# Patient Record
Sex: MALE | Race: BLACK OR AFRICAN AMERICAN | Employment: OTHER | URBAN - METROPOLITAN AREA
[De-identification: names, ages, dates, MRNs, and addresses within clinical notes are randomized per-mention and may not be internally consistent; named-entity substitution may affect disease eponyms.]

---

## 2020-03-02 ENCOUNTER — APPOINTMENT (OUTPATIENT)
Dept: CT IMAGING | Facility: CLINIC | Age: 40
End: 2020-03-02
Attending: EMERGENCY MEDICINE
Payer: MEDICARE

## 2020-03-02 ENCOUNTER — HOSPITAL ENCOUNTER (EMERGENCY)
Facility: CLINIC | Age: 40
Discharge: HOME | End: 2020-03-02
Attending: EMERGENCY MEDICINE | Admitting: EMERGENCY MEDICINE
Payer: MEDICARE

## 2020-03-02 VITALS
HEART RATE: 67 BPM | DIASTOLIC BLOOD PRESSURE: 81 MMHG | RESPIRATION RATE: 16 BRPM | SYSTOLIC BLOOD PRESSURE: 123 MMHG | OXYGEN SATURATION: 99 % | TEMPERATURE: 96.6 F

## 2020-03-02 DIAGNOSIS — R19.7 DIARRHEA, UNSPECIFIED TYPE: ICD-10-CM

## 2020-03-02 LAB
ANION GAP SERPL CALCULATED.3IONS-SCNC: 1 MMOL/L (ref 3–14)
BASOPHILS # BLD AUTO: 0.1 10E9/L (ref 0–0.2)
BASOPHILS NFR BLD AUTO: 0.9 %
BUN SERPL-MCNC: 13 MG/DL (ref 7–30)
CALCIUM SERPL-MCNC: 8.5 MG/DL (ref 8.5–10.1)
CHLORIDE SERPL-SCNC: 110 MMOL/L (ref 94–109)
CO2 SERPL-SCNC: 31 MMOL/L (ref 20–32)
CREAT SERPL-MCNC: 0.86 MG/DL (ref 0.66–1.25)
DIFFERENTIAL METHOD BLD: ABNORMAL
EOSINOPHIL # BLD AUTO: 1.3 10E9/L (ref 0–0.7)
EOSINOPHIL NFR BLD AUTO: 18.2 %
ERYTHROCYTE [DISTWIDTH] IN BLOOD BY AUTOMATED COUNT: 13.1 % (ref 10–15)
GFR SERPL CREATININE-BSD FRML MDRD: >90 ML/MIN/{1.73_M2}
GLUCOSE SERPL-MCNC: 101 MG/DL (ref 70–99)
HCT VFR BLD AUTO: 41.7 % (ref 40–53)
HGB BLD-MCNC: 13.4 G/DL (ref 13.3–17.7)
IMM GRANULOCYTES # BLD: 0 10E9/L (ref 0–0.4)
IMM GRANULOCYTES NFR BLD: 0.1 %
LYMPHOCYTES # BLD AUTO: 1.9 10E9/L (ref 0.8–5.3)
LYMPHOCYTES NFR BLD AUTO: 26.8 %
MCH RBC QN AUTO: 30.1 PG (ref 26.5–33)
MCHC RBC AUTO-ENTMCNC: 32.1 G/DL (ref 31.5–36.5)
MCV RBC AUTO: 94 FL (ref 78–100)
MONOCYTES # BLD AUTO: 0.6 10E9/L (ref 0–1.3)
MONOCYTES NFR BLD AUTO: 8.7 %
NEUTROPHILS # BLD AUTO: 3.2 10E9/L (ref 1.6–8.3)
NEUTROPHILS NFR BLD AUTO: 45.3 %
NRBC # BLD AUTO: 0 10*3/UL
NRBC BLD AUTO-RTO: 0 /100
PLATELET # BLD AUTO: 279 10E9/L (ref 150–450)
POTASSIUM SERPL-SCNC: 3.8 MMOL/L (ref 3.4–5.3)
RBC # BLD AUTO: 4.45 10E12/L (ref 4.4–5.9)
SODIUM SERPL-SCNC: 142 MMOL/L (ref 133–144)
WBC # BLD AUTO: 7.1 10E9/L (ref 4–11)

## 2020-03-02 PROCEDURE — 25800030 ZZH RX IP 258 OP 636: Performed by: EMERGENCY MEDICINE

## 2020-03-02 PROCEDURE — 96360 HYDRATION IV INFUSION INIT: CPT

## 2020-03-02 PROCEDURE — 85025 COMPLETE CBC W/AUTO DIFF WBC: CPT | Performed by: EMERGENCY MEDICINE

## 2020-03-02 PROCEDURE — 96361 HYDRATE IV INFUSION ADD-ON: CPT

## 2020-03-02 PROCEDURE — 80048 BASIC METABOLIC PNL TOTAL CA: CPT | Performed by: EMERGENCY MEDICINE

## 2020-03-02 PROCEDURE — 25000128 H RX IP 250 OP 636: Performed by: EMERGENCY MEDICINE

## 2020-03-02 PROCEDURE — 74177 CT ABD & PELVIS W/CONTRAST: CPT

## 2020-03-02 PROCEDURE — 99285 EMERGENCY DEPT VISIT HI MDM: CPT | Mod: 25

## 2020-03-02 RX ORDER — IOPAMIDOL 755 MG/ML
63 INJECTION, SOLUTION INTRAVASCULAR ONCE
Status: COMPLETED | OUTPATIENT
Start: 2020-03-02 | End: 2020-03-02

## 2020-03-02 RX ORDER — SODIUM CHLORIDE 9 MG/ML
1000 INJECTION, SOLUTION INTRAVENOUS CONTINUOUS
Status: DISCONTINUED | OUTPATIENT
Start: 2020-03-02 | End: 2020-03-02 | Stop reason: HOSPADM

## 2020-03-02 RX ADMIN — SODIUM CHLORIDE 1000 ML: 9 INJECTION, SOLUTION INTRAVENOUS at 11:42

## 2020-03-02 RX ADMIN — IOPAMIDOL 63 ML: 755 INJECTION, SOLUTION INTRAVENOUS at 11:52

## 2020-03-02 NOTE — ED PROVIDER NOTES
History     Chief Complaint:  Diarrhea     HPI   Ashleigh Ma is a 39 year old male who presents to the emergency department for evaluation of diarrhea. The patient reports he has experienced around 3 weeks of diarrhea, 3-4 episodes per day, after eating a [employer] sandwich. The patient's wife states he is also having diarrheic episodes in his sleep. The patient denies any blood in his stool, abdominal pain, chest pain, shortness of breath, fever, or chills, as well as any decreased appetite. He indicates he has not had any recent travel or antibiotic use.    Allergies:  NKDA     Medications:    The patient is currently on no regular medications.    Past Medical History:    Cardiospasm and achalasia     Past Surgical History:    Esophageal sphincter surgery     Family History:    HLD  HTN  Obesity    Social History:  Presents with wife.  Never smoker.   Marital Status:   [2]    Review of Systems   All other systems reviewed and are negative.      Physical Exam     Patient Vitals for the past 24 hrs:   BP Temp Temp src Pulse Resp SpO2   03/02/20 1300 117/75 -- -- 56 -- --   03/02/20 1245 118/77 -- -- 52 -- --   03/02/20 1230 117/71 -- -- 53 -- --   03/02/20 1215 114/79 -- -- 60 -- --   03/02/20 0906 124/83 96.6  F (35.9  C) Temporal 73 16 99 %     Physical Exam  General: Resting on the bed.  Head: No obvious trauma to head.  Ears, Nose, Throat:  External ears normal.  Nose normal.    Eyes:  Conjunctivae clear.  Pupils are equal, round, and reactive.   Neck: Normal range of motion.  Neck supple.   CV: Regular rate and rhythm.  No murmurs.      Respiratory: Effort normal and breath sounds normal.  No wheezing or crackles.   Gastrointestinal: Soft.  No distension. There is mild LLQ tenderness.  There is no rigidity, no rebound and no guarding.   Neuro: Alert. Moving all extremities appropriately.  Normal speech.    Skin: Skin is warm and dry.  No rash noted.     Emergency Department Course      Imaging:  Radiology findings were communicated with the patient who voiced understanding of the findings.    CT Abdomen/Pelvis with contrast:  1. No acute pathology in the abdomen or pelvis.  2. Significant distal esophageal dilatation containing frothy debris.  As per radiology.    Laboratory:  Laboratory findings were communicated with the patient who voiced understanding of the findings.    CBC: WBC: 7.1, HGB: 13.4, PLT: 279  BMP: Glucose 101 (H), Chloride 110 (H), Anion Gap 1 (L), o/w WNL (Creatinine: 0.86)    Ova and parasite exam routine pending.  Clostridium difficile toxin B PCR pending.  Enteric Bacteria and virus panel by [facility] stool pending.    Interventions:  1142 NS 1L IV Bolus    Emergency Department Course:  Past medical records, nursing notes, and vitals reviewed.    1059 I performed an exam of the patient as documented above.     IV was inserted and blood was drawn for laboratory testing, results above.    Stool sample was obtained and sent for laboratory analysis, findings above.    The patient was sent for a CT Abdomen/Pelvis while in the emergency department, results above.     1318 I rechecked the patient and discussed the results of his workup thus far.     Findings and plan explained to the Patient. Patient discharged home with instructions regarding supportive care, medications, and reasons to return. The importance of close follow-up was reviewed.     I personally reviewed the laboratory results with the Patient and answered all related questions prior to discharge.     Impression & Plan     Medical Decision Making:  Ashleigh Ma is a 39 year old male presents with diarrhea.  Vital signs reassuring.  Broad differential was pursued including but not limited to colitis, diverticulitis, infectious diarrhea, parasite, C. difficile, dehydration, electrolyte, metabolic, renal dysfunction, etc.  CBC shows no leukocytosis or anemia.  BMP shows no acute electrolyte, metabolic, renal  dysfunction.  Unable to obtain stool studies as patient is unable to give a stool sample in the ER.  Outpatient ova and parasite, C. difficile and enteric bacteria and viral panel were ordered.  Patient encouraged to bring these to clinic for follow-up.  Patient does have evidence of eosinophils on CBC thus O&P was ordered as well.  CT shows no evidence acute diverticulitis, obstruction, colitis, perforation, etc.  Does show evidence of esophageal dilatation.  In reviewing chart patient does have a history of achalasia and has had prior dilatations.  I encouraged that he follow-up with his primary doctor and surgeon regarding this.  Patient is otherwise well-appearing nontoxic.  No evidence of sepsis or severe dehydration.  No bloody stools, no high fevers, no indication for empiric antibiotics.  No recent trips or travels.  No other high risk features.  Patient unable to provide stool sample in the ER therefore encouraged for gastroenterology outpatient follow-up and primary care follow-up as indicated.  This was advised the next 2 to 3 days.  Strict return precautions were discussed.  Patient was discharged home.    Diagnosis:    ICD-10-CM   1. Diarrhea, unspecified type R19.7     Disposition:  Discharged to home.     Scribe Disclosure:  Alexandre SAMS, am serving as a scribe at 10:55 AM on 3/2/2020 to document services personally performed by Dania Frausto MD based on my observations and the provider's statements to me.     [facility] EMERGENCY DEPARTMENT     Dania Frausto MD  03/02/20 2069

## 2020-03-02 NOTE — ED AVS SNAPSHOT
[facility] Emergency Department  [address]  [facility] [address]  Phone:  [phone]  Fax:  [phone]                                    Ashleigh Ma   MRN: [id number]    Department:  [facility] Emergency Department   Date of Visit:  3/2/2020           After Visit Summary Signature Page    I have received my discharge instructions, and my questions have been answered. I have discussed any challenges I see with this plan with the nurse or doctor.    ..........................................................................................................................................  Patient/Patient Representative Signature      ..........................................................................................................................................  Patient Representative Print Name and Relationship to Patient    ..................................................               ................................................  Date                                   Time    ..........................................................................................................................................  Reviewed by Signature/Title    ...................................................              ..............................................  Date                                               Time          22EPIC Rev 08/18

## 2020-03-02 NOTE — DISCHARGE INSTRUCTIONS
Please collect stool sample and bring to your clinic and or lab to run.  They will call you if the result returns positive.  Regardless you should follow-up with your primary care doctor and gastroenterology to further investigate your ongoing diarrhea.  Follow-up with your surgeon regarding the esophagus as you may need a repeat endoscopy for this.  I would defer to your surgeon to decide if you need further dilatation.  Return to the ER if having fevers, bloody stools, dehydration, severe abdominal pain or other acute concerns.      Discharge Instructions  Adult Diarrhea    You have been seen today for diarrhea (loose stools). This is usually caused by a virus, but some bacteria, parasites, medicines, or other medical conditions can cause similar symptoms. At this time your provider does not find that your diarrhea is a sign of anything dangerous or life-threatening. However, sometimes the signs of serious illness do not show up right away. If you have new or worse symptoms, you may need to be seen again in the Emergency Department or by your primary provider.     Generally, every Emergency Department visit should have a follow-up clinic visit with either a primary or a specialty clinic/provider. Please follow-up as instructed by your emergency provider today.    Return to the Emergency Department if:  You feel you are getting dehydrated, such as being very thirsty, not urinating (peeing) like usual, or feeling faint or lightheaded.   You develop a new fever.  You have abdominal (belly) pain that seems worse than cramps, is in one spot, or is getting worse over time.   You have blood in your stool or your stool becomes black.  (Remember that if you take Pepto-Bismol , this will turn your stool black).   You feel very weak.    What can I do to help myself?  The most important thing to do is to drink clear liquids.   It is best to have only small, frequent sips of liquids. Drinking too much at once may cause more  "diarrhea. You should also replace minerals, sodium and potassium lost with diarrhea. Pedialyte  and sports drinks can help you replace these minerals. You can also drink clear liquids such as water, weak tea, apple juice, and 7-Up . Avoid acidic liquids (orange juice), caffeine (coffee) or alcohol. Milk products will make the diarrhea worse.  Eat bland (plain) foods. Soda crackers, toast, plain noodles, gelatin, applesauce and bananas are good first choices. Avoid foods that have acid, are spicy, fatty or fibrous (such as meats, coarse grains, vegetables). You may start eating these foods again in about 3 days when you are better.   Sometimes treatment includes prescription medicine to prevent diarrhea. If your provider prescribes these for you, take them as directed.   Nonprescription medicine is available for the treatment of diarrhea and can be very effective. If you use it, make sure you use the dose recommended on the package. Check with your healthcare provider before you use any medicine for diarrhea.   Do not take ibuprofen, or other nonsteroidal anti-inflammatory medicines, without checking with your healthcare provider.   Probiotics: If you have been given an antibiotic, you may want to also take a probiotic pill or eat yogurt with live cultures. Probiotics have \"good bacteria\" to help your intestines stay healthy. Studies have shown that probiotics help prevent diarrhea and other intestine problems (including C. diff infection) when you take antibiotics. You can buy these without a prescription in the pharmacy section of the store.   If you were given a prescription for medicine here today, be sure to read all of the information (including the package insert) that comes with your prescription.  This will include important information about the medicine, its side effects, and any warnings that you need to know about.  The pharmacist who fills the prescription can provide more information and answer " questions you may have about the medicine.  If you have questions or concerns that the pharmacist cannot address, please call or return to the Emergency Department.  Remember that you can always come back to the Emergency Department if you are not able to see your regular provider in the amount of time listed above, if you get any new symptoms, or if there is anything that worries you.

## 2020-09-29 ENCOUNTER — HOSPITAL ENCOUNTER (EMERGENCY)
Facility: CLINIC | Age: 40
Discharge: HOME | End: 2020-09-29
Attending: EMERGENCY MEDICINE | Admitting: EMERGENCY MEDICINE
Payer: MEDICARE

## 2020-09-29 VITALS
TEMPERATURE: 98.1 F | OXYGEN SATURATION: 99 % | HEART RATE: 87 BPM | DIASTOLIC BLOOD PRESSURE: 78 MMHG | RESPIRATION RATE: 16 BRPM | SYSTOLIC BLOOD PRESSURE: 128 MMHG

## 2020-09-29 DIAGNOSIS — M67.442 GANGLION CYST OF TENDON SHEATH OF LEFT HAND: ICD-10-CM

## 2020-09-29 PROCEDURE — 99282 EMERGENCY DEPT VISIT SF MDM: CPT

## 2020-09-29 NOTE — ED TRIAGE NOTES
Patient presents to the ED reporting 2 small painless lumps on the top of the left hand. Noticed them today, but is unsure how long they have been there for. Denies known injury.

## 2020-09-29 NOTE — ED PROVIDER NOTES
History     Chief Complaint:  Lumps on Hand    HPI   Ashleigh Ma is a 40 year old male who presents with two small lumps on his hand. He states that earlier today he noticed two small painless lumps on the back of his left hand but in unsure of how long they have been there fore. He states that they are not itchy and denies any trauma.     Allergies:  No Known Allergies     Medications:    No current outpatient medications on file.    Past Medical History:    Depression with anxiety  Achalasia and cardiospasm    Past Surgical History:    Stomach surgery  Esophageal spincter    Family History:    Mother: Hyperlipidemia, Hypertension, Obesity  Sister: Obesity    Social History:  The patient was unaccompanied to the ED.  Smoking Status: Never Smoker  Smokeless Tobacco: Never Used  Alcohol Use: Negative  Drug Use: Negative  [facility]  Marital Status:  Single     Review of Systems   Musculoskeletal:        2 lumps on back of left hand   All other systems reviewed and are negative.      Physical Exam     Patient Vitals for the past 24 hrs:   BP Temp Pulse Resp SpO2   09/29/20 1804 128/78 -- 87 16 99 %   09/29/20 1449 109/81 98.1  F (36.7  C) 69 20 100 %       Physical Exam  General- alert, cooperative  Pulm- normal respiratory effort, no respiratory distress  Msk- RUE: 2+ pulses, sensation to light touch intact, no wounds or abrasions   ROM normal without difficulty, 5/5 strength           LUE: 2+ pulses, sensation to light touch intact, no wounds or abrasions   ROM normal without difficulty, 5/5 strength. Dorsum of hand with 2 small painless nodules over flexor tendons mid hand  Skin- no cyanosis or edema, no swelling, no rash or petechiae.   Psych- normal mood and affect, normal behavior               Emergency Department Course     Emergency Department Course:    Past medical records, nursing notes, and vitals reviewed.  1751: I performed an exam of the patient and obtained  history, as documented above.     Findings and plan explained to the Patient. Patient discharged home with instructions regarding supportive care, medications, and reasons to return. The importance of close follow-up was reviewed.     Impression & Plan      Medical Decision Making:  Ashleigh Ma is a 40 year old male who presents to the emergency department today for evaluation of two painless bumps on the dorsum of the left hand over the tendons. They are firm and nonpruritus and nonpainful. They have the appearance of a ganglion cyst. He is referred to orthopaedic hand for follow up as he desired to get these removed. We did discuss that this is a benign condition. He denies any previous trauma. He is given the phone number and referral over to [facility] to follow up in the next week to his convenience.    Diagnosis:    ICD-10-CM    1. Ganglion cyst of tendon sheath of left hand  M67.442        Disposition:   The patient is discharged to home.    Scribe Disclosure:  I, Gerardo Kuhn, am serving as a scribe at 5:54 PM on 9/29/2020 to document services personally performed by Jasmyne Jorgensen MD based on my observations and the provider's statements to me.  [facility] EMERGENCY DEPARTMENT       Jasmyne Jorgensen MD  09/29/20 6046

## 2020-09-29 NOTE — ED AVS SNAPSHOT
[facility] Emergency Department  [address]  [facility] [address]  Phone:  [phone]  Fax:  [phone]                                    Ashleigh Ma   MRN: [id number]    Department:  [facility] Emergency Department   Date of Visit:  9/29/2020           After Visit Summary Signature Page    I have received my discharge instructions, and my questions have been answered. I have discussed any challenges I see with this plan with the nurse or doctor.    ..........................................................................................................................................  Patient/Patient Representative Signature      ..........................................................................................................................................  Patient Representative Print Name and Relationship to Patient    ..................................................               ................................................  Date                                   Time    ..........................................................................................................................................  Reviewed by Signature/Title    ...................................................              ..............................................  Date                                               Time          22EPIC Rev 08/18